# Patient Record
Sex: MALE | Employment: FULL TIME | URBAN - METROPOLITAN AREA
[De-identification: names, ages, dates, MRNs, and addresses within clinical notes are randomized per-mention and may not be internally consistent; named-entity substitution may affect disease eponyms.]

---

## 2023-05-12 DIAGNOSIS — E03.9 HYPOTHYROIDISM (ACQUIRED): Primary | ICD-10-CM

## 2023-05-12 RX ORDER — LEVOTHYROXINE SODIUM 25 UG/1
25 TABLET ORAL
Qty: 90 TABLET | Refills: 1 | Status: SHIPPED | OUTPATIENT
Start: 2023-05-12 | End: 2023-11-29 | Stop reason: SDUPTHER

## 2023-05-12 RX ORDER — LEVOTHYROXINE SODIUM 25 UG/1
1 TABLET ORAL
COMMUNITY
End: 2023-05-12 | Stop reason: SDUPTHER

## 2023-11-27 DIAGNOSIS — E03.9 HYPOTHYROIDISM (ACQUIRED): ICD-10-CM

## 2023-11-27 DIAGNOSIS — F33.41 RECURRENT MAJOR DEPRESSIVE DISORDER, IN PARTIAL REMISSION (CMS-HCC): Primary | ICD-10-CM

## 2023-11-27 RX ORDER — LEVOTHYROXINE SODIUM 25 UG/1
25 TABLET ORAL
Qty: 90 TABLET | Refills: 1 | OUTPATIENT
Start: 2023-11-27

## 2023-11-27 RX ORDER — BUPROPION HYDROCHLORIDE 150 MG/1
150 TABLET ORAL DAILY
COMMUNITY
End: 2023-11-29 | Stop reason: SDUPTHER

## 2023-11-27 RX ORDER — BUPROPION HYDROCHLORIDE 150 MG/1
150 TABLET ORAL DAILY
Qty: 90 TABLET | Refills: 1 | OUTPATIENT
Start: 2023-11-27

## 2023-11-27 NOTE — TELEPHONE ENCOUNTER
Patient left vm on Rx line requesting refills on Wellbutrin and Synthroid to be sent to local [facility] in chart.

## 2023-11-29 PROBLEM — F32.A DEPRESSION: Status: ACTIVE | Noted: 2023-11-29

## 2023-11-29 RX ORDER — LEVOTHYROXINE SODIUM 25 UG/1
25 TABLET ORAL
Qty: 90 TABLET | Refills: 1 | Status: SHIPPED | OUTPATIENT
Start: 2023-11-29

## 2023-11-29 RX ORDER — BUPROPION HYDROCHLORIDE 150 MG/1
150 TABLET ORAL DAILY
Qty: 90 TABLET | Refills: 0 | Status: SHIPPED | OUTPATIENT
Start: 2023-11-29 | End: 2024-02-27

## 2024-11-18 ENCOUNTER — APPOINTMENT (OUTPATIENT)
Dept: PRIMARY CARE | Facility: CLINIC | Age: 59
End: 2024-11-18
Payer: COMMERCIAL

## 2024-11-18 VITALS — OXYGEN SATURATION: 95 % | SYSTOLIC BLOOD PRESSURE: 122 MMHG | HEART RATE: 112 BPM | DIASTOLIC BLOOD PRESSURE: 87 MMHG

## 2024-11-18 DIAGNOSIS — Z12.5 SCREENING FOR PROSTATE CANCER: ICD-10-CM

## 2024-11-18 DIAGNOSIS — Z13.220 SCREENING FOR HYPERLIPIDEMIA: ICD-10-CM

## 2024-11-18 DIAGNOSIS — Z00.00 WELL ADULT EXAM: Primary | ICD-10-CM

## 2024-11-18 DIAGNOSIS — Z13.21 ENCOUNTER FOR VITAMIN DEFICIENCY SCREENING: ICD-10-CM

## 2024-11-18 DIAGNOSIS — Z13.1 SCREENING FOR DIABETES MELLITUS: ICD-10-CM

## 2024-11-18 DIAGNOSIS — M21.371 BILATERAL FOOT-DROP: ICD-10-CM

## 2024-11-18 DIAGNOSIS — R26.81 GAIT INSTABILITY: ICD-10-CM

## 2024-11-18 DIAGNOSIS — R79.89 ABNORMAL TSH: ICD-10-CM

## 2024-11-18 DIAGNOSIS — M21.372 BILATERAL FOOT-DROP: ICD-10-CM

## 2024-11-18 DIAGNOSIS — G56.23 ULNAR NEUROPATHY OF BOTH UPPER EXTREMITIES: ICD-10-CM

## 2024-11-18 DIAGNOSIS — Z12.11 SCREENING FOR COLON CANCER: ICD-10-CM

## 2024-11-18 DIAGNOSIS — G54.1 LUMBOSACRAL PLEXOPATHY: ICD-10-CM

## 2024-11-18 PROBLEM — M25.559 PAIN IN JOINT INVOLVING PELVIC REGION AND THIGH: Status: ACTIVE | Noted: 2024-11-18

## 2024-11-18 PROBLEM — F33.2 MAJOR DEPRESSIVE DISORDER, RECURRENT SEVERE WITHOUT PSYCHOTIC FEATURES (MULTI): Status: RESOLVED | Noted: 2024-11-18 | Resolved: 2024-11-18

## 2024-11-18 PROBLEM — F10.21 SEVERE ALCOHOL USE DISORDER, IN SUSTAINED REMISSION (MULTI): Status: ACTIVE | Noted: 2021-12-27

## 2024-11-18 PROBLEM — E03.9 HYPOTHYROID: Status: RESOLVED | Noted: 2021-10-27 | Resolved: 2024-11-18

## 2024-11-18 PROBLEM — M62.82 RHABDOMYOLYSIS: Status: RESOLVED | Noted: 2024-11-18 | Resolved: 2024-11-18

## 2024-11-18 PROCEDURE — 99396 PREV VISIT EST AGE 40-64: CPT | Performed by: FAMILY MEDICINE

## 2024-11-18 ASSESSMENT — PATIENT HEALTH QUESTIONNAIRE - PHQ9
SUM OF ALL RESPONSES TO PHQ9 QUESTIONS 1 AND 2: 0
1. LITTLE INTEREST OR PLEASURE IN DOING THINGS: NOT AT ALL
2. FEELING DOWN, DEPRESSED OR HOPELESS: NOT AT ALL

## 2024-11-18 NOTE — PROGRESS NOTES
"  Subjective   Patient ID: Primo David \"Abhishek\" is a 59 y.o. male who presents for Annual Exam (Patient is here for annual physical. ).  New pt to me, prev with Dr Luna    Patient previously very heavy drinker, had some issues with alcohol including elevated liver enzymes.  2020 he fell in the tub and had a severe injury to his right hip, suffered lumbosacral plexopathy.  He spent 18 months in longterm care.  He has bilateral foot drop and wears bilateral AFOs, uses a wheelchair only when leaving the house to long distances such as grocery shopping or coming here.  At home he uses just a cane.  He is not on disability, still works.     Past Medical, Surgical, Social and Family Hx reviewed-Yes    Any acute complaints?    He needs new straps for his AFOs.      Any chronic issues addressed today or Rx refills needed?    No, feels good off his wellbutrin.  No labs x 4 years.      Colon CA screening Hx never  Labs none in 4 years  Up to date on immunizations yes, except shingrix  Dentist in the past year \"not as often as should.\"      ROS:  HEENT negative  GI negative   negative  Cardiac negative  Resp negative, just environmental allergies  Sexual Activity no concerns, not currently  Skin negative  Sleep-chronically not great     PE:  /87   Pulse (!) 112   SpO2 95%   Alert adult man, NAD, seated in wheelchair  HEENT clear  Neck supple, No LAD  Heart RRR no murmur  Lungs CTA bilat  Abdomen benign, normal BS, soft NT/ND  Skin warm, dry, clear  Neuro grossly normal, gait WNL  Affect pleasant and appropriate, memory and judgement WNL  Hands  Legs: no edema, bilat AFOs      Assessment/Plan   Assessment & Plan  Well adult exam  Reviewed HM and vaccines          Lumbosacral plexopathy         Gait instability    Orders:    Comprehensive Metabolic Panel; Future    Bilateral foot-drop  New Rx for AFO straps  Orders:    Comprehensive Metabolic Panel; Future    Ulnar neuropathy of both upper extremities    Orders:    " Referral to Occupational Therapy; Future    Screening for hyperlipidemia    Orders:    Lipid Panel; Future    Screening for colon cancer    Orders:    Cologuard® colon cancer screening; Future    Cologuard® colon cancer screening    Encounter for vitamin deficiency screening    Orders:    Vitamin D 25-Hydroxy,Total (for eval of Vitamin D levels); Future    Screening for diabetes mellitus    Orders:    Hemoglobin A1C; Future    Screening for prostate cancer    Orders:    Prostate Spec.Ag,Screen; Future    Abnormal TSH    Orders:    Thyroid Stimulating Hormone; Future    Thyroxine, Free; Future

## 2024-11-25 ENCOUNTER — TELEPHONE (OUTPATIENT)
Dept: PRIMARY CARE | Facility: CLINIC | Age: 59
End: 2024-11-25
Payer: COMMERCIAL

## 2024-11-26 ENCOUNTER — APPOINTMENT (OUTPATIENT)
Dept: PRIMARY CARE | Facility: CLINIC | Age: 59
End: 2024-11-26
Payer: COMMERCIAL

## 2024-11-28 LAB — NONINV COLON CA DNA+OCC BLD SCRN STL QL: NEGATIVE

## 2024-12-02 ENCOUNTER — APPOINTMENT (OUTPATIENT)
Dept: PRIMARY CARE | Facility: CLINIC | Age: 59
End: 2024-12-02
Payer: COMMERCIAL

## 2025-06-05 DIAGNOSIS — M21.372 BILATERAL FOOT-DROP: ICD-10-CM

## 2025-06-05 DIAGNOSIS — M21.371 BILATERAL FOOT-DROP: ICD-10-CM

## 2025-06-19 ENCOUNTER — OFFICE VISIT (OUTPATIENT)
Dept: PRIMARY CARE | Facility: CLINIC | Age: 60
End: 2025-06-19
Payer: COMMERCIAL

## 2025-06-19 VITALS
HEART RATE: 112 BPM | BODY MASS INDEX: 21.93 KG/M2 | WEIGHT: 170.8 LBS | OXYGEN SATURATION: 97 % | DIASTOLIC BLOOD PRESSURE: 82 MMHG | SYSTOLIC BLOOD PRESSURE: 140 MMHG

## 2025-06-19 DIAGNOSIS — F10.21 SEVERE ALCOHOL USE DISORDER, IN SUSTAINED REMISSION: ICD-10-CM

## 2025-06-19 DIAGNOSIS — F33.1 MODERATE EPISODE OF RECURRENT MAJOR DEPRESSIVE DISORDER: Primary | ICD-10-CM

## 2025-06-19 PROCEDURE — 99213 OFFICE O/P EST LOW 20 MIN: CPT | Performed by: FAMILY MEDICINE

## 2025-06-19 RX ORDER — FLUOXETINE 20 MG/1
20 CAPSULE ORAL DAILY
Qty: 30 CAPSULE | Refills: 1 | Status: SHIPPED | OUTPATIENT
Start: 2025-06-19 | End: 2025-08-18

## 2025-06-19 ASSESSMENT — PATIENT HEALTH QUESTIONNAIRE - PHQ9
SUM OF ALL RESPONSES TO PHQ9 QUESTIONS 1 AND 2: 4
6. FEELING BAD ABOUT YOURSELF - OR THAT YOU ARE A FAILURE OR HAVE LET YOURSELF OR YOUR FAMILY DOWN: NEARLY EVERY DAY
8. MOVING OR SPEAKING SO SLOWLY THAT OTHER PEOPLE COULD HAVE NOTICED. OR THE OPPOSITE, BEING SO FIGETY OR RESTLESS THAT YOU HAVE BEEN MOVING AROUND A LOT MORE THAN USUAL: NOT AT ALL
2. FEELING DOWN, DEPRESSED OR HOPELESS: MORE THAN HALF THE DAYS
9. THOUGHTS THAT YOU WOULD BE BETTER OFF DEAD, OR OF HURTING YOURSELF: NOT AT ALL
5. POOR APPETITE OR OVEREATING: SEVERAL DAYS
3. TROUBLE FALLING OR STAYING ASLEEP OR SLEEPING TOO MUCH: NEARLY EVERY DAY
4. FEELING TIRED OR HAVING LITTLE ENERGY: NOT AT ALL
7. TROUBLE CONCENTRATING ON THINGS, SUCH AS READING THE NEWSPAPER OR WATCHING TELEVISION: NEARLY EVERY DAY
SUM OF ALL RESPONSES TO PHQ QUESTIONS 1-9: 14
1. LITTLE INTEREST OR PLEASURE IN DOING THINGS: MORE THAN HALF THE DAYS

## 2025-06-19 NOTE — LETTER
June 19, 2025     Patient: Primo David   YOB: 1965   Date of Visit: 6/19/2025       To Whom It May Concern:    Primo David was seen in my clinic on 6/19/2025 at 11:20 am.   If you have any questions or concerns, please don't hesitate to call.    He has had bilateral foot drop since 2020 (M21.371 and M21.372).  This patient requires continuous total contact for improved biomechanics and benefits from improved static and dynamic balance and stability     Patient requires increased safety in ADLs and environment (e.g. personal hygiene such as showering, swim therapy, pediatric activities)     Patient benefits from allowing passive and active muscle function  Patient presents with foot/ankle neuromuscular dysfunction     Patient benefits from decreased effort and less fatigue. Patient requires decrease of undesirable motion.  Patient requires improved safety during walking. Patient requires barefoot walking for therapeutic exercises.  Patient requires accommodation for a variety of shoe wear and heel heights.  Patient requires facilitation of receptor responses that improve proprioception and balance.  Patient requires improved overall function and independence. Patient requires improved gait mechanics (e.g. walking speed, foot clearance, symmetry, weight shift, step length, stride length, etc.)    Because of all of the above, in my medical opinion, this patient would benefit from new Silicone AFOs, bilateral.       Sincerely,         Lina Blackwood MD

## 2025-06-19 NOTE — PROGRESS NOTES
"Subjective   Patient ID: Primo David \"Abhishek\" is a 60 y.o. male who presents for Depression (Increasing depression and would like to restart medication for this. ).    Increased depression started about 6 months ago, and seems to be worse in waves.  He has previously taken mneds for depression.  He hated effexor, due to side effects but it worked well.  He tried wellbutrin and it helped, but he got very sweaty and eventually tapered off a couple years ago.  He took something else in between the effexor and wellbutrin.      His kids are all out of state.  He has family [place], who he sees regularly.  He has a few close friends.  No significant other.  He likes his job.  He is frustrated by the current political climate.      Sleep is not great, light sleeper, and he wakes often.  He does not take OTC meds.  It takes him a long time to fall asleep.  No naps.  He has a lof anxiety at times, but he never was anxious before.        Histories reviewed      Objective   /82   Pulse (!) 112   Wt 77.5 kg (170 lb 12.8 oz)   SpO2 97%   BMI 21.93 kg/m²    Physical Exam      Assessment & Plan      "
General

## 2025-06-23 ENCOUNTER — TELEPHONE (OUTPATIENT)
Dept: PRIMARY CARE | Facility: CLINIC | Age: 60
End: 2025-06-23
Payer: COMMERCIAL

## 2025-06-23 DIAGNOSIS — F33.1 MODERATE EPISODE OF RECURRENT MAJOR DEPRESSIVE DISORDER: Primary | ICD-10-CM

## 2025-06-23 NOTE — TELEPHONE ENCOUNTER
Patient called office stating that he was prescribed Fluoxetine on 6/19/25. He took one pill the next morning and noticed towards the evening that he felt itchy. By the next morning, he noticed that his skin is peeling. It looks like he has sun burn he stated but he has not been in the sun. He has not taken any other pills since this started. He is asking what he should do?

## 2025-06-26 RX ORDER — ESCITALOPRAM OXALATE 5 MG/1
5 TABLET ORAL DAILY
Qty: 30 TABLET | Refills: 1 | Status: SHIPPED | OUTPATIENT
Start: 2025-06-26 | End: 2025-09-24

## 2025-06-26 NOTE — TELEPHONE ENCOUNTER
Please call pt and give him this message:  I spoke with Dr Maldonado about what you told him over the phone.  I reviewed our last office note.  I am sending a new prescription for Lexapro 5 mg that you can start instead of the Prozac.  However I noticed in your chart it does say you took 1 medicine you do not River the name of, so I do not sent Lexapro.  If you have any side effects let me know.    Lina Blackwood MD

## 2025-06-26 NOTE — TELEPHONE ENCOUNTER
Called pt and updated him with new Lexapro orders, I did inform him to let us know of any adverse side effects. Understanding voiced.

## 2025-07-04 NOTE — ASSESSMENT & PLAN NOTE
Discussed how SSRIs work, what to expect, possible side effects.  Reviewed tx options, how to start  Follow up 2 months  Suggested talk therapy  Orders:    FLUoxetine (PROzac) 20 mg capsule; Take 1 capsule (20 mg) by mouth once daily.

## 2025-07-31 ENCOUNTER — APPOINTMENT (OUTPATIENT)
Dept: PRIMARY CARE | Facility: CLINIC | Age: 60
End: 2025-07-31
Payer: COMMERCIAL

## 2025-07-31 VITALS
HEART RATE: 106 BPM | BODY MASS INDEX: 23.62 KG/M2 | DIASTOLIC BLOOD PRESSURE: 90 MMHG | OXYGEN SATURATION: 100 % | WEIGHT: 184 LBS | SYSTOLIC BLOOD PRESSURE: 120 MMHG

## 2025-07-31 DIAGNOSIS — F33.1 MODERATE EPISODE OF RECURRENT MAJOR DEPRESSIVE DISORDER: ICD-10-CM

## 2025-07-31 DIAGNOSIS — Z72.820 POOR SLEEP: Primary | ICD-10-CM

## 2025-07-31 PROCEDURE — 99213 OFFICE O/P EST LOW 20 MIN: CPT | Performed by: FAMILY MEDICINE

## 2025-07-31 RX ORDER — ESCITALOPRAM OXALATE 10 MG/1
10 TABLET ORAL DAILY
Qty: 90 TABLET | Refills: 0 | Status: SHIPPED | OUTPATIENT
Start: 2025-07-31 | End: 2025-10-29

## 2025-07-31 RX ORDER — HYDROXYZINE HYDROCHLORIDE 10 MG/1
10-20 TABLET, FILM COATED ORAL NIGHTLY PRN
Qty: 60 TABLET | Refills: 2 | Status: SHIPPED | OUTPATIENT
Start: 2025-07-31 | End: 2025-08-30

## 2025-07-31 NOTE — ASSESSMENT & PLAN NOTE
Will try slightly higher dose lexpro  Orders:    escitalopram (Lexapro) 10 mg tablet; Take 1 tablet (10 mg) by mouth once daily.

## 2025-07-31 NOTE — PROGRESS NOTES
"Subjective   Patient ID: Primo David \"Abhishek\" is a 60 y.o. male who presents for Follow-up (Pt states he is here for follow up regarding Lexapro. No other concerns at the moment.).    I saw him 6 weeks ago.  He tried prozac for a few days and then got a whole body rash.  T was itchy and eventually peeling and his skin sloughed off in multiple places.  He stopped it after 3 days.  He even had some mental sxs, cloudiness and memory issues.  He felt better 3 days later.  He started the lexapro 5 mg 4.5 weeks ago.  No side effects.  He thinks it is definitely helping.  He still has some irritability but better, and better appetite, better sleep.  He has always had poor sleep, and he know it affects his daytime mood.  Naps help only short term, makes the longterm problem worse  Histories reviewed      Objective   /90   Pulse 106   Wt 83.5 kg (184 lb)   SpO2 100%   BMI 23.62 kg/m²    Physical Exam    Alert adult, NAD  Affect pleasant  Heart RRR no murmur  Lungs CTA bilat    Assessment & Plan  Moderate episode of recurrent major depressive disorder  Will try slightly higher dose lexpro  Orders:    escitalopram (Lexapro) 10 mg tablet; Take 1 tablet (10 mg) by mouth once daily.    Poor sleep  Reviewed tx options  Reviewed sleep hygiene   Orders:    hydrOXYzine HCL (Atarax) 10 mg tablet; Take 1-2 tablets (10-20 mg) by mouth as needed at bedtime (sleep).    Follow up 3 months  "

## 2025-10-09 ENCOUNTER — APPOINTMENT (OUTPATIENT)
Dept: PRIMARY CARE | Facility: CLINIC | Age: 60
End: 2025-10-09
Payer: COMMERCIAL